# Patient Record
Sex: FEMALE | HISPANIC OR LATINO | Employment: OTHER | URBAN - METROPOLITAN AREA
[De-identification: names, ages, dates, MRNs, and addresses within clinical notes are randomized per-mention and may not be internally consistent; named-entity substitution may affect disease eponyms.]

---

## 2017-01-28 ENCOUNTER — PATIENT OUTREACH (OUTPATIENT)
Dept: HEALTH INFORMATION MANAGEMENT | Facility: OTHER | Age: 72
End: 2017-01-28

## 2017-01-28 NOTE — PROGRESS NOTES
Outcome: Left Message    Attempt #  1st attempt    Annual Wellness Visit Scheduling  Scheduling Status: 1st attempt    Care Gap Scheduling (Attempt to Schedule EACH Overdue Care Gap!)  Health Maintenance Due   Topic Date Due   • IMM DTaP/Tdap/Td Vaccine (1 - Tdap) 1964   • IMM ZOSTER VACCINE  2006   • IMM PNEUMOCOCCAL 65+ (ADULT) LOW/MEDIUM RISK SERIES (1 of 2 - PCV13) 2011   • MAMMOGRAM  2014   • PAP SMEAR  2016   • IMM INFLUENZA (1) 2016         MyChart Activation:  Code

## 2017-02-12 NOTE — PROGRESS NOTES
Attempt #:2     Annual Wellness Visit Scheduling  1. Scheduling Status:Scheduled     MyChart Activation: Code

## 2018-01-08 ENCOUNTER — PATIENT OUTREACH (OUTPATIENT)
Dept: HEALTH INFORMATION MANAGEMENT | Facility: OTHER | Age: 73
End: 2018-01-08

## 2018-01-08 NOTE — PROGRESS NOTES
Attempt #: Final  [facility] Verified: no  Verify PCP: yes    Communication Preference Obtained: no     Annual Wellness Visit Scheduling  1. Scheduling Status:Scheduled     Care Gap Scheduling (Attempt to Schedule EACH Overdue Care Gap!)     Health Maintenance Due   Topic Date Due   • IMM DTaP/Tdap/Td Vaccine (1 - Tdap) [birth year]16/1964   • IMM ZOSTER VACCINE  02/16/2006   • IMM PNEUMOCOCCAL 65+ (ADULT) LOW/MEDIUM RISK SERIES (1 of 2 - PCV13) 02/16/2011   • MAMMOGRAM  05/01/2014   • PAP SMEAR  04/24/2016   • IMM INFLUENZA (1) 09/01/2017       Not able to address care gaps    Scheduled patient for Annual Wellness Visit

## 2019-01-02 PROBLEM — M75.121 COMPLETE TEAR OF RIGHT ROTATOR CUFF: Status: ACTIVE | Noted: 2019-01-02

## 2019-02-05 ENCOUNTER — PATIENT OUTREACH (OUTPATIENT)
Dept: HEALTH INFORMATION MANAGEMENT | Facility: OTHER | Age: 74
End: 2019-02-05

## 2019-02-05 NOTE — PROGRESS NOTES
Attempt #: Final  Verify PCP: yes    Communication Preference Obtained: yes     Annual Wellness Visit Scheduling  1. Scheduling Status:Scheduled     Care Gap Scheduling (Attempt to Schedule EACH Overdue Care Gap!)/ Not able to address care gaps.  Health Maintenance Due   Topic Date Due   • IMM DTaP/Tdap/Td Vaccine (1 - Tdap) 02/16/1965   • IMM ZOSTER VACCINES (1 of 2) 02/16/1996   • IMM PNEUMOCOCCAL 65+ (ADULT) LOW/MEDIUM RISK SERIES (1 of 2 - PCV13) 02/16/2011   • MAMMOGRAM  05/01/2014   • PAP SMEAR  04/24/2016   • IMM INFLUENZA (1) 09/01/2018       MyChart Activation: declined

## 2021-03-05 ENCOUNTER — PATIENT OUTREACH (OUTPATIENT)
Dept: HEALTH INFORMATION MANAGEMENT | Facility: OTHER | Age: 76
End: 2021-03-05

## 2021-03-05 NOTE — PROGRESS NOTES
Called patient to schedule for AWV.    Outcome:   Left Message Please transfer to Patient Outreach Team at [phone] when patient returns call.     Attempt # 1  -[id number]

## 2021-03-30 NOTE — PROGRESS NOTES
Outcome: Scheduled AWV    Please transfer to Patient Outreach Team at [phone] when patient returns call.    [facility] Verified: no    Attempt # 2